# Patient Record
Sex: MALE | Race: ASIAN | NOT HISPANIC OR LATINO
[De-identification: names, ages, dates, MRNs, and addresses within clinical notes are randomized per-mention and may not be internally consistent; named-entity substitution may affect disease eponyms.]

---

## 2023-07-31 ENCOUNTER — APPOINTMENT (OUTPATIENT)
Dept: PULMONOLOGY | Facility: CLINIC | Age: 63
End: 2023-07-31
Payer: COMMERCIAL

## 2023-07-31 VITALS
RESPIRATION RATE: 16 BRPM | WEIGHT: 174 LBS | HEART RATE: 73 BPM | SYSTOLIC BLOOD PRESSURE: 138 MMHG | TEMPERATURE: 97 F | DIASTOLIC BLOOD PRESSURE: 67 MMHG | OXYGEN SATURATION: 95 % | BODY MASS INDEX: 25.77 KG/M2 | HEIGHT: 69 IN

## 2023-07-31 DIAGNOSIS — J93.83 OTHER PNEUMOTHORAX: ICD-10-CM

## 2023-07-31 DIAGNOSIS — Z86.11 PERSONAL HISTORY OF TUBERCULOSIS: ICD-10-CM

## 2023-07-31 DIAGNOSIS — Z78.9 OTHER SPECIFIED HEALTH STATUS: ICD-10-CM

## 2023-07-31 DIAGNOSIS — Z80.1 FAMILY HISTORY OF MALIGNANT NEOPLASM OF TRACHEA, BRONCHUS AND LUNG: ICD-10-CM

## 2023-07-31 DIAGNOSIS — N40.0 BENIGN PROSTATIC HYPERPLASIA WITHOUT LOWER URINARY TRACT SYMPMS: ICD-10-CM

## 2023-07-31 DIAGNOSIS — E11.9 TYPE 2 DIABETES MELLITUS W/OUT COMPLICATIONS: ICD-10-CM

## 2023-07-31 DIAGNOSIS — E78.5 HYPERLIPIDEMIA, UNSPECIFIED: ICD-10-CM

## 2023-07-31 DIAGNOSIS — Z82.0 FAMILY HISTORY OF EPILEPSY AND OTHER DISEASES OF THE NERVOUS SYSTEM: ICD-10-CM

## 2023-07-31 DIAGNOSIS — I10 ESSENTIAL (PRIMARY) HYPERTENSION: ICD-10-CM

## 2023-07-31 PROCEDURE — 99204 OFFICE O/P NEW MOD 45 MIN: CPT

## 2023-07-31 RX ORDER — METFORMIN ER 500 MG 500 MG/1
500 TABLET ORAL
Refills: 0 | Status: ACTIVE | COMMUNITY

## 2023-07-31 RX ORDER — ATORVASTATIN CALCIUM 40 MG/1
40 TABLET, FILM COATED ORAL
Refills: 0 | Status: ACTIVE | COMMUNITY

## 2023-07-31 RX ORDER — VALSARTAN 40 MG/1
40 TABLET, COATED ORAL
Refills: 0 | Status: ACTIVE | COMMUNITY

## 2023-07-31 RX ORDER — CHOLECALCIFEROL (VITAMIN D3) 125 MCG
50 MCG TABLET ORAL
Refills: 0 | Status: ACTIVE | COMMUNITY

## 2023-07-31 RX ORDER — TAMSULOSIN HYDROCHLORIDE 0.4 MG/1
0.4 CAPSULE ORAL
Refills: 0 | Status: ACTIVE | COMMUNITY

## 2023-07-31 NOTE — ASSESSMENT
[FreeTextEntry1] : This is a 63-year-old male here for evaluation of possible sleep apnea. The patient has multiple signs and symptoms of sleep-disordered breathing including snoring, waking up multiple awakenings and daytime sleepiness. He was referred to the [facility] for a diagnostic HSAT. The ramifications of FRANCY and its potential therapeutic modalities were discussed with the patient. The patient was cautioned on the importance of avoiding drowsy driving. He will follow up with us after the HSAT.

## 2023-07-31 NOTE — HISTORY OF PRESENT ILLNESS
[Snoring] : snoring [To Bed: ___] : ~he/she~ goes to bed at [unfilled] [Arises: ___] : arises at [unfilled] [Sleep Onset Latency: ___ minutes] : sleep onset latency of [unfilled] minutes reported [Nocturnal Awakenings: ___] : ~he/she~ typically has [unfilled] nocturnal awakenings [WASO: ___] : Wake time after sleep onset is [unfilled] [Frequent Nocturnal Awakening] : frequent nocturnal awakening [Unintentional Sleep While Inactive] : unintentional sleep while inactive [Awakes Unrefreshed] : awakening unrefreshed [Daytime Somnolence] : daytime somnolence [TST: ___] : Total sleep time is [unfilled] [Daytime Sleep: ___] : daytime sleep: [unfilled] [FreeTextEntry1] : This is a 63 year old male referred by Dr. Roosevelt Hobbs for evaluation of possible sleep apnea.  Comorbid medical conditions include hypertension, hyperlipidemia, diabetes, BPH, history of tuberculosis in college s/p therapy x 2 years as well as history of spontaneous pneumothorax.  The patient reports a longstanding history of feeling tired during the day and falling asleep unintentionally.  Patient wakes up multiple times during the night to urinate, sometimes has trouble falling back asleep.  He is not extremely tired when he wakes up though he is not fully refreshed either.  He does find himself dozing off when inactive.  Other sleep-related symptoms and sleep schedule are as listed below.  He wears a mouthguard for bruxism that he started recently. [Witnessed Apneas] : no witnessed sleep apnea [Unintentional Sleep while Active] : no unintentional sleep while active [Awakes with Headache] : no headache upon awakening [Awakening With Dry Mouth] : no dry mouth upon awakening [Recent  Weight Gain] : no recent weight gain [ESS] : 17

## 2023-07-31 NOTE — PHYSICAL EXAM
[General Appearance - Well Developed] : well developed [Normal Appearance] : normal appearance [General Appearance - Well Nourished] : well nourished [No Deformities] : no deformities [Elongated Uvula] : elongated uvula [Enlarged Base of the Tongue] : enlargement of the base of the tongue [IV] : IV [Neck Appearance] : the appearance of the neck was normal [Apical Impulse] : the apical impulse was normal [Heart Rate And Rhythm] : heart rate was normal and rhythm regular [Heart Sounds] : normal S1 and S2 [] : no respiratory distress [Respiration, Rhythm And Depth] : normal respiratory rhythm and effort [Exaggerated Use Of Accessory Muscles For Inspiration] : no accessory muscle use [Involuntary Movements] : no involuntary movements were seen [Nail Clubbing] : no clubbing of the fingernails [Cyanosis, Localized] : no localized cyanosis [Skin Color & Pigmentation] : normal skin color and pigmentation [Skin Turgor] : normal skin turgor [No Focal Deficits] : no focal deficits [Oriented To Time, Place, And Person] : oriented to person, place, and time [Impaired Insight] : insight and judgment were intact [Affect] : the affect was normal [Mood] : the mood was normal

## 2023-07-31 NOTE — REVIEW OF SYSTEMS
[Snoring] : snoring [Nocturia] : nocturia [Witnessed Apneas] : no witnessed apnea [A.M. Headache] : no headache present upon awakening

## 2023-08-25 ENCOUNTER — APPOINTMENT (OUTPATIENT)
Dept: SLEEP CENTER | Facility: CLINIC | Age: 63
End: 2023-08-25
Payer: COMMERCIAL

## 2023-08-25 ENCOUNTER — OUTPATIENT (OUTPATIENT)
Dept: OUTPATIENT SERVICES | Facility: HOSPITAL | Age: 63
LOS: 1 days | End: 2023-08-25
Payer: COMMERCIAL

## 2023-08-25 PROCEDURE — 95800 SLP STDY UNATTENDED: CPT | Mod: 26

## 2023-08-25 PROCEDURE — 95800 SLP STDY UNATTENDED: CPT

## 2023-09-01 ENCOUNTER — NON-APPOINTMENT (OUTPATIENT)
Age: 63
End: 2023-09-01

## 2023-09-05 ENCOUNTER — APPOINTMENT (OUTPATIENT)
Dept: PULMONOLOGY | Facility: CLINIC | Age: 63
End: 2023-09-05

## 2023-09-14 DIAGNOSIS — G47.33 OBSTRUCTIVE SLEEP APNEA (ADULT) (PEDIATRIC): ICD-10-CM

## 2023-11-24 ENCOUNTER — NON-APPOINTMENT (OUTPATIENT)
Age: 63
End: 2023-11-24

## 2023-11-24 PROCEDURE — 92136 OPHTHALMIC BIOMETRY: CPT

## 2023-11-24 PROCEDURE — 92133 CPTRZD OPH DX IMG PST SGM ON: CPT

## 2023-11-24 PROCEDURE — 92004 COMPRE OPH EXAM NEW PT 1/>: CPT | Mod: 25

## 2023-11-27 ENCOUNTER — NON-APPOINTMENT (OUTPATIENT)
Age: 63
End: 2023-11-27

## 2023-11-27 ENCOUNTER — APPOINTMENT (OUTPATIENT)
Dept: OPHTHALMOLOGY | Facility: CLINIC | Age: 63
End: 2023-11-27
Payer: COMMERCIAL

## 2024-01-02 ENCOUNTER — EMERGENCY (EMERGENCY)
Facility: HOSPITAL | Age: 64
LOS: 1 days | Discharge: HOME | End: 2024-01-02
Attending: EMERGENCY MEDICINE
Payer: COMMERCIAL

## 2024-01-02 VITALS
SYSTOLIC BLOOD PRESSURE: 162 MMHG | HEART RATE: 78 BPM | WEIGHT: 173.94 LBS | DIASTOLIC BLOOD PRESSURE: 98 MMHG | RESPIRATION RATE: 19 BRPM | OXYGEN SATURATION: 96 % | TEMPERATURE: 99 F

## 2024-01-02 LAB
ALBUMIN SERPL ELPH-MCNC: 4 G/DL — SIGNIFICANT CHANGE UP (ref 3.3–5)
ALBUMIN SERPL ELPH-MCNC: 4 G/DL — SIGNIFICANT CHANGE UP (ref 3.3–5)
ALP SERPL-CCNC: 67 U/L — SIGNIFICANT CHANGE UP (ref 40–120)
ALP SERPL-CCNC: 67 U/L — SIGNIFICANT CHANGE UP (ref 40–120)
ALT FLD-CCNC: 28 U/L — SIGNIFICANT CHANGE UP (ref 10–45)
ALT FLD-CCNC: 28 U/L — SIGNIFICANT CHANGE UP (ref 10–45)
AMPHET UR-MCNC: NEGATIVE — SIGNIFICANT CHANGE UP
AMPHET UR-MCNC: NEGATIVE — SIGNIFICANT CHANGE UP
ANION GAP SERPL CALC-SCNC: 10 MMOL/L — SIGNIFICANT CHANGE UP (ref 5–17)
ANION GAP SERPL CALC-SCNC: 10 MMOL/L — SIGNIFICANT CHANGE UP (ref 5–17)
APAP SERPL-MCNC: <15 UG/ML — SIGNIFICANT CHANGE UP (ref 10–30)
APAP SERPL-MCNC: <15 UG/ML — SIGNIFICANT CHANGE UP (ref 10–30)
AST SERPL-CCNC: 21 U/L — SIGNIFICANT CHANGE UP (ref 10–40)
AST SERPL-CCNC: 21 U/L — SIGNIFICANT CHANGE UP (ref 10–40)
BARBITURATES UR SCN-MCNC: NEGATIVE — SIGNIFICANT CHANGE UP
BARBITURATES UR SCN-MCNC: NEGATIVE — SIGNIFICANT CHANGE UP
BASOPHILS # BLD AUTO: 0.02 K/UL — SIGNIFICANT CHANGE UP (ref 0–0.2)
BASOPHILS # BLD AUTO: 0.02 K/UL — SIGNIFICANT CHANGE UP (ref 0–0.2)
BASOPHILS NFR BLD AUTO: 0.3 % — SIGNIFICANT CHANGE UP (ref 0–2)
BASOPHILS NFR BLD AUTO: 0.3 % — SIGNIFICANT CHANGE UP (ref 0–2)
BENZODIAZ UR-MCNC: NEGATIVE — SIGNIFICANT CHANGE UP
BENZODIAZ UR-MCNC: NEGATIVE — SIGNIFICANT CHANGE UP
BILIRUB SERPL-MCNC: 0.2 MG/DL — SIGNIFICANT CHANGE UP (ref 0.2–1.2)
BILIRUB SERPL-MCNC: 0.2 MG/DL — SIGNIFICANT CHANGE UP (ref 0.2–1.2)
BUN SERPL-MCNC: 16 MG/DL — SIGNIFICANT CHANGE UP (ref 7–23)
BUN SERPL-MCNC: 16 MG/DL — SIGNIFICANT CHANGE UP (ref 7–23)
CALCIUM SERPL-MCNC: 9.5 MG/DL — SIGNIFICANT CHANGE UP (ref 8.4–10.5)
CALCIUM SERPL-MCNC: 9.5 MG/DL — SIGNIFICANT CHANGE UP (ref 8.4–10.5)
CHLORIDE SERPL-SCNC: 107 MMOL/L — SIGNIFICANT CHANGE UP (ref 96–108)
CHLORIDE SERPL-SCNC: 107 MMOL/L — SIGNIFICANT CHANGE UP (ref 96–108)
CO2 SERPL-SCNC: 25 MMOL/L — SIGNIFICANT CHANGE UP (ref 22–31)
CO2 SERPL-SCNC: 25 MMOL/L — SIGNIFICANT CHANGE UP (ref 22–31)
COCAINE METAB.OTHER UR-MCNC: NEGATIVE — SIGNIFICANT CHANGE UP
COCAINE METAB.OTHER UR-MCNC: NEGATIVE — SIGNIFICANT CHANGE UP
CREAT SERPL-MCNC: 1.03 MG/DL — SIGNIFICANT CHANGE UP (ref 0.5–1.3)
CREAT SERPL-MCNC: 1.03 MG/DL — SIGNIFICANT CHANGE UP (ref 0.5–1.3)
EGFR: 81 ML/MIN/1.73M2 — SIGNIFICANT CHANGE UP
EGFR: 81 ML/MIN/1.73M2 — SIGNIFICANT CHANGE UP
EOSINOPHIL # BLD AUTO: 0.18 K/UL — SIGNIFICANT CHANGE UP (ref 0–0.5)
EOSINOPHIL # BLD AUTO: 0.18 K/UL — SIGNIFICANT CHANGE UP (ref 0–0.5)
EOSINOPHIL NFR BLD AUTO: 2.6 % — SIGNIFICANT CHANGE UP (ref 0–6)
EOSINOPHIL NFR BLD AUTO: 2.6 % — SIGNIFICANT CHANGE UP (ref 0–6)
ETHANOL SERPL-MCNC: <10 MG/DL — SIGNIFICANT CHANGE UP (ref 0–10)
ETHANOL SERPL-MCNC: <10 MG/DL — SIGNIFICANT CHANGE UP (ref 0–10)
GLUCOSE SERPL-MCNC: 116 MG/DL — HIGH (ref 70–99)
GLUCOSE SERPL-MCNC: 116 MG/DL — HIGH (ref 70–99)
HCT VFR BLD CALC: 40.2 % — SIGNIFICANT CHANGE UP (ref 39–50)
HCT VFR BLD CALC: 40.2 % — SIGNIFICANT CHANGE UP (ref 39–50)
HGB BLD-MCNC: 13.3 G/DL — SIGNIFICANT CHANGE UP (ref 13–17)
HGB BLD-MCNC: 13.3 G/DL — SIGNIFICANT CHANGE UP (ref 13–17)
IMM GRANULOCYTES NFR BLD AUTO: 0.4 % — SIGNIFICANT CHANGE UP (ref 0–0.9)
IMM GRANULOCYTES NFR BLD AUTO: 0.4 % — SIGNIFICANT CHANGE UP (ref 0–0.9)
LYMPHOCYTES # BLD AUTO: 1.75 K/UL — SIGNIFICANT CHANGE UP (ref 1–3.3)
LYMPHOCYTES # BLD AUTO: 1.75 K/UL — SIGNIFICANT CHANGE UP (ref 1–3.3)
LYMPHOCYTES # BLD AUTO: 24.8 % — SIGNIFICANT CHANGE UP (ref 13–44)
LYMPHOCYTES # BLD AUTO: 24.8 % — SIGNIFICANT CHANGE UP (ref 13–44)
MCHC RBC-ENTMCNC: 32.4 PG — SIGNIFICANT CHANGE UP (ref 27–34)
MCHC RBC-ENTMCNC: 32.4 PG — SIGNIFICANT CHANGE UP (ref 27–34)
MCHC RBC-ENTMCNC: 33.1 GM/DL — SIGNIFICANT CHANGE UP (ref 32–36)
MCHC RBC-ENTMCNC: 33.1 GM/DL — SIGNIFICANT CHANGE UP (ref 32–36)
MCV RBC AUTO: 98 FL — SIGNIFICANT CHANGE UP (ref 80–100)
MCV RBC AUTO: 98 FL — SIGNIFICANT CHANGE UP (ref 80–100)
METHADONE UR-MCNC: NEGATIVE — SIGNIFICANT CHANGE UP
METHADONE UR-MCNC: NEGATIVE — SIGNIFICANT CHANGE UP
MONOCYTES # BLD AUTO: 0.6 K/UL — SIGNIFICANT CHANGE UP (ref 0–0.9)
MONOCYTES # BLD AUTO: 0.6 K/UL — SIGNIFICANT CHANGE UP (ref 0–0.9)
MONOCYTES NFR BLD AUTO: 8.5 % — SIGNIFICANT CHANGE UP (ref 2–14)
MONOCYTES NFR BLD AUTO: 8.5 % — SIGNIFICANT CHANGE UP (ref 2–14)
NEUTROPHILS # BLD AUTO: 4.47 K/UL — SIGNIFICANT CHANGE UP (ref 1.8–7.4)
NEUTROPHILS # BLD AUTO: 4.47 K/UL — SIGNIFICANT CHANGE UP (ref 1.8–7.4)
NEUTROPHILS NFR BLD AUTO: 63.4 % — SIGNIFICANT CHANGE UP (ref 43–77)
NEUTROPHILS NFR BLD AUTO: 63.4 % — SIGNIFICANT CHANGE UP (ref 43–77)
NRBC # BLD: 0 /100 WBCS — SIGNIFICANT CHANGE UP (ref 0–0)
NRBC # BLD: 0 /100 WBCS — SIGNIFICANT CHANGE UP (ref 0–0)
OPIATES UR-MCNC: NEGATIVE — SIGNIFICANT CHANGE UP
OPIATES UR-MCNC: NEGATIVE — SIGNIFICANT CHANGE UP
OXYCODONE UR-MCNC: NEGATIVE — SIGNIFICANT CHANGE UP
OXYCODONE UR-MCNC: NEGATIVE — SIGNIFICANT CHANGE UP
PCP SPEC-MCNC: SIGNIFICANT CHANGE UP
PCP SPEC-MCNC: SIGNIFICANT CHANGE UP
PCP UR-MCNC: NEGATIVE — SIGNIFICANT CHANGE UP
PCP UR-MCNC: NEGATIVE — SIGNIFICANT CHANGE UP
PLATELET # BLD AUTO: 165 K/UL — SIGNIFICANT CHANGE UP (ref 150–400)
PLATELET # BLD AUTO: 165 K/UL — SIGNIFICANT CHANGE UP (ref 150–400)
POTASSIUM SERPL-MCNC: 4.3 MMOL/L — SIGNIFICANT CHANGE UP (ref 3.5–5.3)
POTASSIUM SERPL-MCNC: 4.3 MMOL/L — SIGNIFICANT CHANGE UP (ref 3.5–5.3)
POTASSIUM SERPL-SCNC: 4.3 MMOL/L — SIGNIFICANT CHANGE UP (ref 3.5–5.3)
POTASSIUM SERPL-SCNC: 4.3 MMOL/L — SIGNIFICANT CHANGE UP (ref 3.5–5.3)
PROT SERPL-MCNC: 7.3 G/DL — SIGNIFICANT CHANGE UP (ref 6–8.3)
PROT SERPL-MCNC: 7.3 G/DL — SIGNIFICANT CHANGE UP (ref 6–8.3)
RBC # BLD: 4.1 M/UL — LOW (ref 4.2–5.8)
RBC # BLD: 4.1 M/UL — LOW (ref 4.2–5.8)
RBC # FLD: 11.7 % — SIGNIFICANT CHANGE UP (ref 10.3–14.5)
RBC # FLD: 11.7 % — SIGNIFICANT CHANGE UP (ref 10.3–14.5)
SALICYLATES SERPL-MCNC: <2 MG/DL — LOW (ref 15–30)
SALICYLATES SERPL-MCNC: <2 MG/DL — LOW (ref 15–30)
SODIUM SERPL-SCNC: 142 MMOL/L — SIGNIFICANT CHANGE UP (ref 135–145)
SODIUM SERPL-SCNC: 142 MMOL/L — SIGNIFICANT CHANGE UP (ref 135–145)
THC UR QL: NEGATIVE — SIGNIFICANT CHANGE UP
THC UR QL: NEGATIVE — SIGNIFICANT CHANGE UP
WBC # BLD: 7.05 K/UL — SIGNIFICANT CHANGE UP (ref 3.8–10.5)
WBC # BLD: 7.05 K/UL — SIGNIFICANT CHANGE UP (ref 3.8–10.5)
WBC # FLD AUTO: 7.05 K/UL — SIGNIFICANT CHANGE UP (ref 3.8–10.5)
WBC # FLD AUTO: 7.05 K/UL — SIGNIFICANT CHANGE UP (ref 3.8–10.5)

## 2024-01-02 PROCEDURE — 72125 CT NECK SPINE W/O DYE: CPT | Mod: MA

## 2024-01-02 PROCEDURE — 80307 DRUG TEST PRSMV CHEM ANLYZR: CPT

## 2024-01-02 PROCEDURE — 99285 EMERGENCY DEPT VISIT HI MDM: CPT | Mod: 25

## 2024-01-02 PROCEDURE — 80053 COMPREHEN METABOLIC PANEL: CPT

## 2024-01-02 PROCEDURE — 99285 EMERGENCY DEPT VISIT HI MDM: CPT

## 2024-01-02 PROCEDURE — 70450 CT HEAD/BRAIN W/O DYE: CPT | Mod: MA

## 2024-01-02 PROCEDURE — 36415 COLL VENOUS BLD VENIPUNCTURE: CPT

## 2024-01-02 PROCEDURE — 72125 CT NECK SPINE W/O DYE: CPT | Mod: 26,MA

## 2024-01-02 PROCEDURE — 93005 ELECTROCARDIOGRAM TRACING: CPT

## 2024-01-02 PROCEDURE — 85025 COMPLETE CBC W/AUTO DIFF WBC: CPT

## 2024-01-02 PROCEDURE — 70450 CT HEAD/BRAIN W/O DYE: CPT | Mod: 26,MA

## 2024-01-02 RX ORDER — ACETAMINOPHEN 500 MG
650 TABLET ORAL ONCE
Refills: 0 | Status: COMPLETED | OUTPATIENT
Start: 2024-01-02 | End: 2024-01-02

## 2024-01-02 RX ADMIN — Medication 650 MILLIGRAM(S): at 19:38
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
## 2024-01-02 NOTE — ED ADULT TRIAGE NOTE - CHIEF COMPLAINT QUOTE
64 yo M pt PMHx of HTN, DM, HLD p/w single mechanical fall last night hit the top of his head with no LOC. pt c/o headache and RUE pain. pt takes ASA daily.

## 2024-01-02 NOTE — ED PROVIDER NOTE - OBJECTIVE STATEMENT
62 yo male in blue 35R presents to the ER for evaluation of head injury and right arm pain .  PT awake alert oriented x3  states "I have had chronic pain to my right arm and I think it was from an accident several years ago. I am also having pain in my head"  Wife at bedside states "He has depression and  Last night he got very angry and started to hit his head with a stick of bamboo repeatedly".  Pt with several supervisal abrasions and areas of erythema to top of head".  Pt states that he wanted to hurt himself  and he has felt like this in the past and states "sometimes I just was to disappear".  Pt denies  HI at this time.  Will obtain labs and place on 1-1 observation.   Pt reports  mild to moderate headache and will obtain CT head and neck.

## 2024-01-02 NOTE — ED PROVIDER NOTE - NSFOLLOWUPCLINICS_GEN_ALL_ED_FT
[facility] Psychiatry  Psychiatry  [address] [place] [address]  Phone: [phone]  Fax:   Follow Up Time: Routine     [facility] Psychiatry  Psychiatry  [address] [place] [address]  Phone: [phone]  Fax:   Follow Up Time: Routine

## 2024-01-02 NOTE — ED ADULT NURSE NOTE - OBJECTIVE STATEMENT
62 y/o male no significant PMHx arrives to [facility] ED from home with c/o head injury and R arm pain. Pt states he was angry yesterday and started to hit himself in the head repeatedly. Pt endorses hx of depression, used to be prescribed antidepressants by PCP but has stopped taking them as it makes him "feel sleepy." Pt stated sometimes he just "wants to disappear." Pt states he has done thi sin the past but yesterday was the worst. Patient placed in gown and all belongings have been obtained and given to patients wife. Patient wife removed belongings and placed in vehicle. Patient wife at bedside. Patient is A&Ox4. Respirations spontaneous and unlabored. Denies SOB, CP, abdominal pain, n/v/d, urinary symptoms, fever/chills. Skin intact. 62 y/o male no significant PMHx arrives to [facility] ED from home with c/o head injury and R arm pain. Pt states he was angry yesterday and started to hit himself in the head repeatedly. Pt endorses hx of depression, used to be prescribed antidepressants by PCP but has stopped taking them as it makes him "feel sleepy." Pt stated sometimes he just "wants to disappear." Pt states he has done thi sin the past but yesterday was the worst. Patient placed in gown and all belongings have been obtained and given to patients wife. Patient wife removed belongings and placed in vehicle. Patient wife at bedside. Patient is A&Ox4. Respirations spontaneous and unlabored. Denies SOB, CP, abdominal pain, n/v/d, urinary symptoms, fever/chills. Skin intact.

## 2024-01-02 NOTE — ED PROVIDER NOTE - NSFOLLOWUPINSTRUCTIONS_ED_ALL_ED_FT
You had your wound repaired with stitches today.  You need to have the stitches removed in 1 week.    Keep the wound clean with soap and water.    If you notice redness around the wound, fluid or discharge from it, if you have fever, these could be signs of a skin infection and you need to get evaluated by a doctor right away.    Please follow-up with psychiatry particularly if you continue to have symptoms of depression.    If you have thoughts about hurting yourself or others, please come to the emergency department right away for help.      ?? ??? ??? ???????. 1?? ?? ??? ???? ???.    ??? ?? ??? ???? ??????.    ?? ??? ?????, ?? ?? ???? ????, ??? ?? ??, ?? ?? ??? ??? ? ???? ?? ??? ??? ??? ???.    ??, ??? ??? ????? ??? ??? ????? ????.    ???? ?? ??? ?? ??? ?? ?? ???? ?? ??? ?????.  aletha sangcheoleul kkwemaeseo chilyohasyeossseubnida. 1ju-il an-e silbab-eul jegeohaeya habnida.    maria castro sangcheoleul kkaekkeushage yujihasibsio.    sangbrenda walkera bulg-eojigeona, cheaeg ttoneun bunbimul-i boigeona, bal-yeol-i issneun gyeong-u, ineun pibu sabrina-yeom-ui jinghu-il crockett iss-eumeulo jeugsi uisaui jinchal-eul bad-aya habnida.    sarah junior jeungsang-i jisogdoendamyeon jeongsingwa jinlyoleul bad-abosigi balabnida.    jasin-chelly daleun salam-eul haechil saeng-gag-i deulmyeon jeugsi eung-geubsillo gaseo doum-eul bad-eusibsio.

## 2024-01-02 NOTE — ED PROVIDER NOTE - PATIENT PORTAL LINK FT
You can access the FollowMyHealth Patient Portal offered by [facility] by registering at the following website: [email or web address]. By joining [employer]’s FollowMyHealth portal, you will also be able to view your health information using other applications (apps) compatible with our system. You can access the FollowMyHealth Patient Portal offered by [facility] by registering at the following website: [email or web address]. By joining [employer]’s FollowMyHealth portal, you will also be able to view your health information using other applications (apps) compatible with our system.

## 2024-01-02 NOTE — ED PROVIDER NOTE - PROGRESS NOTE DETAILS
Dr. Stallworth's note: At the beginning of my shift, i took over care of the patient from outgoing physician with plan to Follow-up psychiatry recommendations.  Psychiatry evaluated the patient and deem them safe for discharge, not at risk to self or others acutely, and safe for outpatient follow-up.    Patient is well-appearing and stable for discharge.  Patient was given wound care instructions, return precautions, psychiatry clinic follow-up.

## 2024-01-02 NOTE — ED ADULT NURSE NOTE - NSFALLUNIVINTERV_ED_ALL_ED
Bed/Stretcher in lowest position, wheels locked, appropriate side rails in place/Call bell, personal items and telephone in reach/Instruct patient to call for assistance before getting out of bed/chair/stretcher/Non-slip footwear applied when patient is off stretcher/[place] to call system/Physically safe environment - no spills, clutter or unnecessary equipment/Purposeful proactive rounding/Room/bathroom lighting operational, light cord in reach Bed/Stretcher in lowest position, wheels locked, appropriate side rails in place/Call bell, personal items and telephone in reach/Instruct patient to call for assistance before getting out of bed/chair/stretcher/Non-slip footwear applied when patient is off stretcher/[place] to call system/Physically safe environment - no spills, clutter or unnecessary equipment/Purposeful proactive rounding/Room/bathroom lighting operational, light cord in reach

## 2024-01-02 NOTE — ED ADULT NURSE NOTE - CHIEF COMPLAINT QUOTE
62 yo M pt PMHx of HTN, DM, HLD p/w single mechanical fall last night hit the top of his head with no LOC. pt c/o headache and RUE pain. pt takes ASA daily.

## 2024-01-02 NOTE — ED ADULT NURSE NOTE - CAS TRG GENERAL AIRWAY, MLM
Problem: Occupational Therapy Goal  Goal: Occupational Therapy Goal  Goals to be met by: 12/25/17     Patient will increase functional independence with ADLs by performing:    UE Dressing with Set-up Assistance and Supervision.  Grooming while standing at sink with Contact Guard Assistance and Assistive Devices as needed.  Toileting from bedside commode with Minimal Assistance and Assistive Devices as needed for hygiene and clothing management.   Sitting at edge of bed x5 minutes with Supervision.  Supine to sit with Contact Guard Assistance and use of bedrail as needed.  Toilet transfer to bedside commode with Minimal Assistance.  Upper extremity exercise program x10 reps per handout, with assistance as needed.    Outcome: Ongoing (interventions implemented as appropriate)  OT evaluation completed today. Goals & care plan established.    SELMA Black  12/11/2017           Patent

## 2024-01-03 VITALS
RESPIRATION RATE: 18 BRPM | SYSTOLIC BLOOD PRESSURE: 113 MMHG | DIASTOLIC BLOOD PRESSURE: 73 MMHG | OXYGEN SATURATION: 96 % | TEMPERATURE: 97 F | HEART RATE: 91 BPM

## 2024-01-03 DIAGNOSIS — F63.81 INTERMITTENT EXPLOSIVE DISORDER: ICD-10-CM

## 2024-01-03 PROCEDURE — 90792 PSYCH DIAG EVAL W/MED SRVCS: CPT | Mod: 95

## 2024-01-03 NOTE — ED BEHAVIORAL HEALTH NOTE - BEHAVIORAL HEALTH NOTE
Writer attempted to contact patient's wife Peace Platt at [phone]; left message requesting callback. Writer attempted to contact patient's wife Peace Platt at [phone]; left message requesting callback.

## 2024-01-03 NOTE — ED BEHAVIORAL HEALTH ASSESSMENT NOTE - NSBHATTESTBILLING_PSY_A_CORE
[id number] diagnostic evaluation with medical services [id number] diagnostic evaluation with medical services

## 2024-01-03 NOTE — ED BEHAVIORAL HEALTH ASSESSMENT NOTE - SUMMARY
Mr. Platt is a 62 y/o Urdu-speaking M, employed as an , domiciled with wife, with a pmhx of hypercholesteremia, diabetes, BPH, HTN and pphx of depression  presenting to the ED after an angry episode the day before.    On evaluation patient was calm and cooperative. He has a history of depression and intermittent explosive episodes. He currently meets criteria for a mild a depressive episode. Patient was offered voluntary psychiatric admission but he preferred outpatient follow-up. He denied any symptoms associated with acute dangerousness including SI/HI, and he does not meet criteria for involuntary psychiatric admission. He was able to safety plan and has a supportive family. He has some chronic risk factors including age, sex, and psychiatric diagnosis but also has a number of protective factors including no hx of SA, no hx of psychiatric hospitalization, employment, residential stability, and a supportive family. He is future-oriented and help-seeking and would like to engage in outpatient treatment with preferably a Urdu-speaking provider. Patient is psychiatrically cleared. Mr. Platt is a 62 y/o Estonian-speaking M, employed as an , domiciled with wife, with a pmhx of hypercholesteremia, diabetes, BPH, HTN and pphx of depression  presenting to the ED after an angry episode the day before.    On evaluation patient was calm and cooperative. He has a history of depression and intermittent explosive episodes. He currently meets criteria for a mild a depressive episode. Patient was offered voluntary psychiatric admission but he preferred outpatient follow-up. He denied any symptoms associated with acute dangerousness including SI/HI, and he does not meet criteria for involuntary psychiatric admission. He was able to safety plan and has a supportive family. He has some chronic risk factors including age, sex, and psychiatric diagnosis but also has a number of protective factors including no hx of SA, no hx of psychiatric hospitalization, employment, residential stability, and a supportive family. He is future-oriented and help-seeking and would like to engage in outpatient treatment with preferably a Estonian-speaking provider. Patient is psychiatrically cleared.
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
## 2024-01-03 NOTE — ED ADULT NURSE REASSESSMENT NOTE - NS ED NURSE REASSESS COMMENT FT1
received pt. awake and calm, explained to pt. that Telepsych was recently completed and assessment/disposition is pending @ this time. 1:1 CO for s/i maintained. received pt. awake and calm, explained to pt. that he will be speaking w/ a psychiatrist via Telepsych.. 1:1 CO for s/i maintained.

## 2024-01-03 NOTE — ED BEHAVIORAL HEALTH ASSESSMENT NOTE - NS ED [facility] TELEPSYCH PROVIDER LOCATION
[address] [facility], [place], [place] [address] [facility], [place]

## 2024-01-03 NOTE — ED ADULT NURSE REASSESSMENT NOTE - NS ED NURSE REASSESS COMMENT FT1
pt. was discharged to home accompanied by his wife and w/ GEORGESH outpt referrals given to pt. pt. was calm and appropriate w/ no evidenced of psychosis or disorganized behavior. pt. denies any active s/hi.

## 2024-01-03 NOTE — ED BEHAVIORAL HEALTH ASSESSMENT NOTE - DETAILS
see hpi will not change dispo writer spoke to wife patient knows to return to ED if any safety concerns arise

## 2024-01-03 NOTE — ED BEHAVIORAL HEALTH NOTE - BEHAVIORAL HEALTH NOTE
==================  PRE-HOSPITAL COURSE  ===================  SOURCE:  ANASTASIA Bah and secondhand ED documentation  DETAILS: BIB wife for fall/head trauma  =========  ED COURSE  =========  SOURCE:  ANASTASIA Bah and secondhand ED documentation.  ARRIVAL:  Per chart and RN, patient arrived via walk in. Per RN, patient was calm upon arrival, and cooperative with triage process.  BELONGINGS:  Per RN, patient arrived with belongings. All belongings were provided to hospital security, and patient currently in a gown with a 1:1 staff member.  BEHAVIOR: RN described patient to be calm and cooperative, presenting with linear thought process, AAOx3, presenting with normal mood and congruent affect, remains in behavioral and impulse control, is not violent/aggressive. RN stated that the patient is not endorsing SI/HI/A/VH. RN stated that the patient appears to be well-groomed, maintains good hygiene.  TREATMENT:  Per chart and RN, patient received PRN medications: PO Tylenol 650mg for pain.   VISITORS:  Per RN, wife at bedside.         COVID Exposure Screen- collateral (i.e. third-party, chart review, belongings, etc; include EMS and ED staff)   ---------------------------------------------------  1. Has the patient had a COVID-19 test in the last 90 days? Unknown.   2. Has the patient tested positive for COVID-19 antibodies? Unknown.   3. Has the patient received 2 doses of the COVID-19 vaccine? Unknown  4. In the past 10 days, has the patient been around anyone with a positive COVID-19 test?* Unknown.   5. Has the patient been out of [place] within the past 10 days? Unknown

## 2024-01-11 ENCOUNTER — TRANSCRIPTION ENCOUNTER (OUTPATIENT)
Age: 64
End: 2024-01-11

## 2024-02-21 ENCOUNTER — OUTPATIENT (OUTPATIENT)
Dept: OUTPATIENT SERVICES | Facility: HOSPITAL | Age: 64
LOS: 1 days | Discharge: STILL ADMITTED OR ADMINISTRATIVE | End: 2024-02-21
Payer: MEDICARE

## 2024-02-22 DIAGNOSIS — F33.2 MAJOR DEPRESSIVE DISORDER, RECURRENT SEVERE WITHOUT PSYCHOTIC FEATURES: ICD-10-CM

## 2024-03-25 ENCOUNTER — OUTPATIENT (OUTPATIENT)
Dept: OUTPATIENT SERVICES | Facility: HOSPITAL | Age: 64
LOS: 1 days | Discharge: HOME | End: 2024-03-25
Payer: MEDICARE

## 2024-03-25 PROCEDURE — 90792 PSYCH DIAG EVAL W/MED SRVCS: CPT

## 2024-03-26 DIAGNOSIS — E11.9 TYPE 2 DIABETES MELLITUS WITHOUT COMPLICATIONS: ICD-10-CM

## 2024-03-26 DIAGNOSIS — H26.9 UNSPECIFIED CATARACT: ICD-10-CM

## 2024-03-26 DIAGNOSIS — F32.1 MAJOR DEPRESSIVE DISORDER, SINGLE EPISODE, MODERATE: ICD-10-CM

## 2024-03-26 DIAGNOSIS — I10 ESSENTIAL (PRIMARY) HYPERTENSION: ICD-10-CM

## 2024-03-26 DIAGNOSIS — N40.0 BENIGN PROSTATIC HYPERPLASIA WITHOUT LOWER URINARY TRACT SYMPTOMS: ICD-10-CM

## 2024-03-27 NOTE — ED BEHAVIORAL HEALTH ASSESSMENT NOTE - NSBHMSESPEECH_PSY_A_CORE
Normal volume, rate, productivity, spontaneity and articulation
Strong peripheral pulses/Capillary refill less/equal to 2 seconds

## 2024-03-29 PROCEDURE — 90834 PSYTX W PT 45 MINUTES: CPT

## 2024-04-11 ENCOUNTER — APPOINTMENT (OUTPATIENT)
Dept: PULMONOLOGY | Facility: CLINIC | Age: 64
End: 2024-04-11

## 2024-04-11 ENCOUNTER — APPOINTMENT (OUTPATIENT)
Dept: PULMONOLOGY | Facility: CLINIC | Age: 64
End: 2024-04-11
Payer: MEDICARE

## 2024-04-11 VITALS
WEIGHT: 168 LBS | TEMPERATURE: 98 F | OXYGEN SATURATION: 96 % | DIASTOLIC BLOOD PRESSURE: 72 MMHG | HEART RATE: 63 BPM | HEIGHT: 69 IN | RESPIRATION RATE: 15 BRPM | BODY MASS INDEX: 24.88 KG/M2 | SYSTOLIC BLOOD PRESSURE: 116 MMHG

## 2024-04-11 DIAGNOSIS — G47.33 OBSTRUCTIVE SLEEP APNEA (ADULT) (PEDIATRIC): ICD-10-CM

## 2024-04-11 PROCEDURE — 99214 OFFICE O/P EST MOD 30 MIN: CPT

## 2024-04-11 NOTE — PHYSICAL EXAM
[General Appearance - Well Developed] : well developed [Normal Appearance] : normal appearance [General Appearance - Well Nourished] : well nourished [No Deformities] : no deformities [] : no respiratory distress [Exaggerated Use Of Accessory Muscles For Inspiration] : no accessory muscle use [Involuntary Movements] : no involuntary movements were seen [Nail Clubbing] : no clubbing of the fingernails [Cyanosis, Localized] : no localized cyanosis [Skin Color & Pigmentation] : normal skin color and pigmentation [No Focal Deficits] : no focal deficits [Oriented To Time, Place, And Person] : oriented to person, place, and time [Impaired Insight] : insight and judgment were intact [Affect] : the affect was normal [Mood] : the mood was normal [Neck Appearance] : the appearance of the neck was normal [Respiration, Rhythm And Depth] : normal respiratory rhythm and effort

## 2024-04-11 NOTE — ASSESSMENT
[FreeTextEntry1] : This is a 64 year old male with moderate FRANCY on CPAP here for a follow up visit. Therapeutic and compliance data download reveals usage 97% of days with an average use of 7 hours and 50 minutes. Therapy based AHI is 0.4/hr on 4 - 20 cm H2O, 95th percentile pressure 6.2 cm H2O.  Switch temperature settings to manual and set it to 76 F.  Advised that patient can lower the setting on his device himself if the tube temperature is uncomfortable.  The patient is experiencing benefit from CPAP and should continue to use. He will follow up in 1 year or sooner if needed.

## 2024-04-11 NOTE — HISTORY OF PRESENT ILLNESS
[Snoring] : snoring [Frequent Nocturnal Awakening] : frequent nocturnal awakening [Unintentional Sleep While Inactive] : unintentional sleep while inactive [Awakes Unrefreshed] : awakening unrefreshed [Daytime Somnolence] : daytime somnolence [To Bed: ___] : ~he/she~ goes to bed at [unfilled] [Arises: ___] : arises at [unfilled] [Sleep Onset Latency: ___ minutes] : sleep onset latency of [unfilled] minutes reported [Nocturnal Awakenings: ___] : ~he/she~ typically has [unfilled] nocturnal awakenings [WASO: ___] : Wake time after sleep onset is [unfilled] [TST: ___] : Total sleep time is [unfilled] [Daytime Sleep: ___] : daytime sleep: [unfilled] [FreeTextEntry1] : This is a 64-year-old male with moderate FRANCY here for follow-up visit after initiation of therapy.  Comorbid medical conditions include hypertension, hyperlipidemia, diabetes, BPH, history of tuberculosis in college s/p therapy x 2 years as well as history of spontaneous pneumothorax. He is also undergoing treatment for depression.  HST (8/25/2023) AHI 25.7/hr, T90 17.4%.  Device: AirSense 11 AutoSet (9/27/2023) Settings: APAP 4 - 20 cm H2O Mask: Nasal DME: [facility]   He is using the device on a nightly basis and feels a benefit from use.  He notes improved daytime sleepiness.  He states that he previously used to fall asleep while driving but does not happen any longer.  He is comfortable with the current mask but notes that over the last few weeks them air feels too warm. [Witnessed Apneas] : no witnessed sleep apnea [Unintentional Sleep while Active] : no unintentional sleep while active [Awakes with Headache] : no headache upon awakening [Awakening With Dry Mouth] : no dry mouth upon awakening [Recent  Weight Gain] : no recent weight gain [ESS] : 17

## 2024-04-15 NOTE — ED ADULT NURSE NOTE - ALCOHOL PRE SCREEN (AUDIT - C)
April 15, 2024      To Whom It May Concern:      Harrison Love was seen in our Emergency Department today, 04/15/24.  I expect his condition to improve over the next 2 weeks.  He may return to PE when improved.    Sincerely,        Jamia ORDAZ RN         Statement Selected

## 2024-04-29 PROCEDURE — 90833 PSYTX W PT W E/M 30 MIN: CPT

## 2024-04-29 PROCEDURE — 99214 OFFICE O/P EST MOD 30 MIN: CPT

## 2024-05-15 ENCOUNTER — APPOINTMENT (OUTPATIENT)
Dept: OPHTHALMOLOGY | Facility: CLINIC | Age: 64
End: 2024-05-15
Payer: MEDICARE

## 2024-05-15 ENCOUNTER — NON-APPOINTMENT (OUTPATIENT)
Age: 64
End: 2024-05-15

## 2024-05-15 PROCEDURE — 92136 OPHTHALMIC BIOMETRY: CPT

## 2024-05-15 PROCEDURE — 99214 OFFICE O/P EST MOD 30 MIN: CPT

## 2024-06-03 PROCEDURE — 90833 PSYTX W PT W E/M 30 MIN: CPT

## 2024-06-03 PROCEDURE — 99214 OFFICE O/P EST MOD 30 MIN: CPT

## 2024-07-01 PROCEDURE — 90833 PSYTX W PT W E/M 30 MIN: CPT

## 2024-07-01 PROCEDURE — 99214 OFFICE O/P EST MOD 30 MIN: CPT

## 2024-07-23 PROCEDURE — 90833 PSYTX W PT W E/M 30 MIN: CPT

## 2024-07-23 PROCEDURE — 99214 OFFICE O/P EST MOD 30 MIN: CPT

## 2024-08-08 ENCOUNTER — APPOINTMENT (OUTPATIENT)
Dept: OPHTHALMOLOGY | Facility: AMBULATORY SURGERY CENTER | Age: 64
End: 2024-08-08

## 2024-08-08 PROCEDURE — 66984 XCAPSL CTRC RMVL W/O ECP: CPT | Mod: RT

## 2024-08-09 ENCOUNTER — NON-APPOINTMENT (OUTPATIENT)
Age: 64
End: 2024-08-09

## 2024-08-09 ENCOUNTER — APPOINTMENT (OUTPATIENT)
Dept: OPHTHALMOLOGY | Facility: CLINIC | Age: 64
End: 2024-08-09

## 2024-08-09 PROCEDURE — 99024 POSTOP FOLLOW-UP VISIT: CPT

## 2024-08-22 ENCOUNTER — APPOINTMENT (OUTPATIENT)
Dept: OPHTHALMOLOGY | Facility: CLINIC | Age: 64
End: 2024-08-22
Payer: MEDICARE

## 2024-08-22 ENCOUNTER — NON-APPOINTMENT (OUTPATIENT)
Age: 64
End: 2024-08-22

## 2024-08-22 PROCEDURE — 99024 POSTOP FOLLOW-UP VISIT: CPT

## 2024-09-04 PROCEDURE — 99214 OFFICE O/P EST MOD 30 MIN: CPT

## 2024-09-04 PROCEDURE — 90833 PSYTX W PT W E/M 30 MIN: CPT

## 2024-09-12 ENCOUNTER — APPOINTMENT (OUTPATIENT)
Dept: OPHTHALMOLOGY | Facility: AMBULATORY SURGERY CENTER | Age: 64
End: 2024-09-12
Payer: MEDICARE

## 2024-09-12 PROCEDURE — 66984 XCAPSL CTRC RMVL W/O ECP: CPT | Mod: LT,79

## 2024-09-13 ENCOUNTER — NON-APPOINTMENT (OUTPATIENT)
Age: 64
End: 2024-09-13

## 2024-09-13 ENCOUNTER — APPOINTMENT (OUTPATIENT)
Dept: OPHTHALMOLOGY | Facility: CLINIC | Age: 64
End: 2024-09-13
Payer: MEDICARE

## 2024-09-13 PROCEDURE — 99024 POSTOP FOLLOW-UP VISIT: CPT

## 2024-09-24 ENCOUNTER — APPOINTMENT (OUTPATIENT)
Dept: OPHTHALMOLOGY | Facility: CLINIC | Age: 64
End: 2024-09-24
Payer: MEDICARE

## 2024-09-24 ENCOUNTER — NON-APPOINTMENT (OUTPATIENT)
Age: 64
End: 2024-09-24

## 2024-09-24 PROCEDURE — 99024 POSTOP FOLLOW-UP VISIT: CPT

## 2024-10-10 PROCEDURE — 90833 PSYTX W PT W E/M 30 MIN: CPT

## 2024-10-10 PROCEDURE — 99214 OFFICE O/P EST MOD 30 MIN: CPT

## 2024-10-30 ENCOUNTER — NON-APPOINTMENT (OUTPATIENT)
Age: 64
End: 2024-10-30

## 2024-10-30 ENCOUNTER — APPOINTMENT (OUTPATIENT)
Dept: OPHTHALMOLOGY | Facility: CLINIC | Age: 64
End: 2024-10-30
Payer: MEDICARE

## 2024-10-30 PROCEDURE — 99024 POSTOP FOLLOW-UP VISIT: CPT

## 2024-11-14 PROCEDURE — 99214 OFFICE O/P EST MOD 30 MIN: CPT

## 2024-11-14 PROCEDURE — 90833 PSYTX W PT W E/M 30 MIN: CPT

## 2025-01-14 PROCEDURE — 90833 PSYTX W PT W E/M 30 MIN: CPT

## 2025-01-14 PROCEDURE — 99214 OFFICE O/P EST MOD 30 MIN: CPT

## 2025-04-29 ENCOUNTER — APPOINTMENT (OUTPATIENT)
Dept: OPHTHALMOLOGY | Facility: CLINIC | Age: 65
End: 2025-04-29
Payer: MEDICARE

## 2025-04-29 ENCOUNTER — NON-APPOINTMENT (OUTPATIENT)
Age: 65
End: 2025-04-29

## 2025-04-29 PROCEDURE — 76514 ECHO EXAM OF EYE THICKNESS: CPT

## 2025-04-29 PROCEDURE — 92133 CPTRZD OPH DX IMG PST SGM ON: CPT

## 2025-04-29 PROCEDURE — 92014 COMPRE OPH EXAM EST PT 1/>: CPT | Mod: 25

## 2025-05-12 ENCOUNTER — APPOINTMENT (OUTPATIENT)
Dept: CARDIOLOGY | Facility: CLINIC | Age: 65
End: 2025-05-12
Payer: MEDICARE

## 2025-05-12 VITALS
OXYGEN SATURATION: 98 % | DIASTOLIC BLOOD PRESSURE: 75 MMHG | HEART RATE: 89 BPM | BODY MASS INDEX: 25.18 KG/M2 | HEIGHT: 69 IN | RESPIRATION RATE: 16 BRPM | SYSTOLIC BLOOD PRESSURE: 117 MMHG | WEIGHT: 170 LBS

## 2025-05-12 DIAGNOSIS — I34.1 NONRHEUMATIC MITRAL (VALVE) PROLAPSE: ICD-10-CM

## 2025-05-12 PROCEDURE — 99204 OFFICE O/P NEW MOD 45 MIN: CPT | Mod: 25

## 2025-05-12 PROCEDURE — 93000 ELECTROCARDIOGRAM COMPLETE: CPT

## 2025-06-03 PROCEDURE — 99214 OFFICE O/P EST MOD 30 MIN: CPT

## 2025-06-03 PROCEDURE — 90833 PSYTX W PT W E/M 30 MIN: CPT

## 2025-06-23 ENCOUNTER — APPOINTMENT (OUTPATIENT)
Dept: CARDIOLOGY | Facility: CLINIC | Age: 65
End: 2025-06-23
Payer: MEDICARE

## 2025-06-23 VITALS — DIASTOLIC BLOOD PRESSURE: 69 MMHG | SYSTOLIC BLOOD PRESSURE: 107 MMHG

## 2025-06-23 PROCEDURE — 99214 OFFICE O/P EST MOD 30 MIN: CPT

## 2025-06-23 PROCEDURE — 93306 TTE W/DOPPLER COMPLETE: CPT

## 2025-06-23 PROCEDURE — G2211 COMPLEX E/M VISIT ADD ON: CPT
